# Patient Record
Sex: MALE | Race: WHITE | Employment: OTHER | URBAN - METROPOLITAN AREA
[De-identification: names, ages, dates, MRNs, and addresses within clinical notes are randomized per-mention and may not be internally consistent; named-entity substitution may affect disease eponyms.]

---

## 2018-05-18 ASSESSMENT — MIFFLIN-ST. JEOR: SCORE: 1637.57

## 2018-05-21 ENCOUNTER — ANESTHESIA - HEALTHEAST (OUTPATIENT)
Dept: SURGERY | Facility: HOSPITAL | Age: 73
End: 2018-05-21

## 2018-05-22 ENCOUNTER — SURGERY - HEALTHEAST (OUTPATIENT)
Dept: SURGERY | Facility: HOSPITAL | Age: 73
End: 2018-05-22

## 2018-06-07 ENCOUNTER — HOSPITAL ENCOUNTER (EMERGENCY)
Facility: CLINIC | Age: 73
Discharge: HOME | End: 2018-06-07
Attending: EMERGENCY MEDICINE | Admitting: EMERGENCY MEDICINE
Payer: MEDICARE

## 2018-06-07 VITALS
SYSTOLIC BLOOD PRESSURE: 120 MMHG | DIASTOLIC BLOOD PRESSURE: 70 MMHG | RESPIRATION RATE: 16 BRPM | HEIGHT: 70 IN | TEMPERATURE: 97.1 F | OXYGEN SATURATION: 96 %

## 2018-06-07 DIAGNOSIS — N99.820 POSTOPERATIVE HEMORRHAGE INVOLVING GENITOURINARY SYSTEM FOLLOWING GENITOURINARY PROCEDURE: ICD-10-CM

## 2018-06-07 DIAGNOSIS — R31.0 GROSS HEMATURIA: ICD-10-CM

## 2018-06-07 LAB
ALBUMIN UR-MCNC: 100 MG/DL
ALBUMIN UR-MCNC: ABNORMAL MG/DL
ANION GAP SERPL CALCULATED.3IONS-SCNC: 8 MMOL/L (ref 3–14)
APPEARANCE UR: ABNORMAL
APPEARANCE UR: ABNORMAL
BASOPHILS # BLD AUTO: 0.1 10E9/L (ref 0–0.2)
BASOPHILS NFR BLD AUTO: 0.5 %
BILIRUB UR QL STRIP: ABNORMAL
BILIRUB UR QL STRIP: NEGATIVE
BUN SERPL-MCNC: 20 MG/DL (ref 7–30)
CALCIUM SERPL-MCNC: 8.4 MG/DL (ref 8.5–10.1)
CHLORIDE SERPL-SCNC: 107 MMOL/L (ref 94–109)
CO2 SERPL-SCNC: 24 MMOL/L (ref 20–32)
COLOR UR AUTO: ABNORMAL
COLOR UR AUTO: ABNORMAL
CREAT SERPL-MCNC: 0.93 MG/DL (ref 0.66–1.25)
DIFFERENTIAL METHOD BLD: ABNORMAL
EOSINOPHIL # BLD AUTO: 0.2 10E9/L (ref 0–0.7)
EOSINOPHIL NFR BLD AUTO: 1.9 %
ERYTHROCYTE [DISTWIDTH] IN BLOOD BY AUTOMATED COUNT: 12.2 % (ref 10–15)
GFR SERPL CREATININE-BSD FRML MDRD: 79 ML/MIN/1.7M2
GLUCOSE SERPL-MCNC: 130 MG/DL (ref 70–99)
GLUCOSE UR STRIP-MCNC: 50 MG/DL
GLUCOSE UR STRIP-MCNC: ABNORMAL MG/DL
HCT VFR BLD AUTO: 40.3 % (ref 40–53)
HGB BLD-MCNC: 13.2 G/DL (ref 13.3–17.7)
HGB UR QL STRIP: ABNORMAL
HGB UR QL STRIP: ABNORMAL
IMM GRANULOCYTES # BLD: 0.1 10E9/L (ref 0–0.4)
IMM GRANULOCYTES NFR BLD: 0.5 %
KETONES UR STRIP-MCNC: 20 MG/DL
KETONES UR STRIP-MCNC: ABNORMAL MG/DL
LEUKOCYTE ESTERASE UR QL STRIP: ABNORMAL
LEUKOCYTE ESTERASE UR QL STRIP: NEGATIVE
LYMPHOCYTES # BLD AUTO: 0.9 10E9/L (ref 0.8–5.3)
LYMPHOCYTES NFR BLD AUTO: 9.6 %
MCH RBC QN AUTO: 30.7 PG (ref 26.5–33)
MCHC RBC AUTO-ENTMCNC: 32.8 G/DL (ref 31.5–36.5)
MCV RBC AUTO: 94 FL (ref 78–100)
MONOCYTES # BLD AUTO: 0.4 10E9/L (ref 0–1.3)
MONOCYTES NFR BLD AUTO: 4.3 %
NEUTROPHILS # BLD AUTO: 8.2 10E9/L (ref 1.6–8.3)
NEUTROPHILS NFR BLD AUTO: 83.2 %
NITRATE UR QL: ABNORMAL
NITRATE UR QL: NEGATIVE
NRBC # BLD AUTO: 0 10*3/UL
NRBC BLD AUTO-RTO: 0 /100
PH UR STRIP: 6 PH (ref 5–7)
PH UR STRIP: ABNORMAL PH (ref 5–7)
PLATELET # BLD AUTO: 213 10E9/L (ref 150–450)
POTASSIUM SERPL-SCNC: 4.5 MMOL/L (ref 3.4–5.3)
RBC # BLD AUTO: 4.3 10E12/L (ref 4.4–5.9)
RBC #/AREA URNS AUTO: 0 /HPF (ref 0–2)
RBC #/AREA URNS AUTO: ABNORMAL /HPF (ref 0–2)
SODIUM SERPL-SCNC: 139 MMOL/L (ref 133–144)
SOURCE: ABNORMAL
SOURCE: ABNORMAL
SP GR UR STRIP: 1.02 (ref 1–1.03)
SP GR UR STRIP: ABNORMAL (ref 1–1.03)
UROBILINOGEN UR STRIP-MCNC: 0 MG/DL (ref 0–2)
UROBILINOGEN UR STRIP-MCNC: ABNORMAL MG/DL (ref 0–2)
WBC # BLD AUTO: 9.8 10E9/L (ref 4–11)
WBC #/AREA URNS AUTO: ABNORMAL /HPF (ref 0–5)

## 2018-06-07 PROCEDURE — 81001 URINALYSIS AUTO W/SCOPE: CPT | Performed by: EMERGENCY MEDICINE

## 2018-06-07 PROCEDURE — 87086 URINE CULTURE/COLONY COUNT: CPT | Performed by: EMERGENCY MEDICINE

## 2018-06-07 PROCEDURE — 25000132 ZZH RX MED GY IP 250 OP 250 PS 637: Mod: GY | Performed by: EMERGENCY MEDICINE

## 2018-06-07 PROCEDURE — 99284 EMERGENCY DEPT VISIT MOD MDM: CPT | Mod: 25

## 2018-06-07 PROCEDURE — 80048 BASIC METABOLIC PNL TOTAL CA: CPT | Performed by: EMERGENCY MEDICINE

## 2018-06-07 PROCEDURE — 85025 COMPLETE CBC W/AUTO DIFF WBC: CPT | Performed by: EMERGENCY MEDICINE

## 2018-06-07 PROCEDURE — 99284 EMERGENCY DEPT VISIT MOD MDM: CPT | Mod: Z6 | Performed by: EMERGENCY MEDICINE

## 2018-06-07 PROCEDURE — 51702 INSERT TEMP BLADDER CATH: CPT

## 2018-06-07 PROCEDURE — A9270 NON-COVERED ITEM OR SERVICE: HCPCS | Mod: GY | Performed by: EMERGENCY MEDICINE

## 2018-06-07 PROCEDURE — 25000125 ZZHC RX 250: Performed by: EMERGENCY MEDICINE

## 2018-06-07 RX ORDER — CIPROFLOXACIN 500 MG/1
500 TABLET, FILM COATED ORAL 2 TIMES DAILY
Qty: 6 TABLET | Refills: 0 | Status: SHIPPED | OUTPATIENT
Start: 2018-06-07 | End: 2018-06-10

## 2018-06-07 RX ORDER — CIPROFLOXACIN 500 MG/1
500 TABLET, FILM COATED ORAL ONCE
Status: COMPLETED | OUTPATIENT
Start: 2018-06-07 | End: 2018-06-07

## 2018-06-07 RX ADMIN — LIDOCAINE HYDROCHLORIDE 10 ML: 20 JELLY TOPICAL at 05:44

## 2018-06-07 RX ADMIN — CIPROFLOXACIN 500 MG: 500 TABLET, FILM COATED ORAL at 06:12

## 2018-06-07 ASSESSMENT — ENCOUNTER SYMPTOMS
DIZZINESS: 0
LIGHT-HEADEDNESS: 0
HEMATURIA: 1
FREQUENCY: 1
COUGH: 0
CHEST TIGHTNESS: 0
SHORTNESS OF BREATH: 0
BRUISES/BLEEDS EASILY: 0
VOMITING: 0
DYSURIA: 0
ABDOMINAL PAIN: 0
APPETITE CHANGE: 0
BACK PAIN: 0
NAUSEA: 0
FEVER: 0

## 2018-06-07 NOTE — ED NOTES
Rosa cath placed w/o problem. Bag filling rapidly, dark red urine out. Pt reports feeling relief immediately after cath placement.

## 2018-06-07 NOTE — DISCHARGE INSTRUCTIONS
Blood in the Urine    Blood in the urine (hematuria) has many possible causes. If it occurs after an injury (such as a car accident or fall), it is most often a sign of bruising to the kidney or bladder. Common causes of blood in the urine include urinary tract infections, kidney stones, inflammation, tumors, or certain other diseases of the kidney or bladder. Menstruation can cause blood to appear in the urine sample, although it is not coming from the urinary tract.  If only a trace amount of blood is present, it will show up on the urine test, even though the urine may be yellow and not pink or red. This may occur with any of the above conditions, as well as heavy exercise or high fever. In this case, your doctor may want to repeat the urine test on another day. This will show if the blood is still present. If it is, then other tests can be done to find out the cause.  Home care  Follow these home care guidelines:    If your urine does not appear bloody (pink, brown or red) then you do not need to restrict your activity in any way.    If you can see blood in your urine, rest and avoid heavy exertion until your next exam. Do not use aspirin, blood thinners, or anti-platelet or anti-inflammatory medicines. These include ibuprofen and naproxen. These thin the blood and may increase bleeding.  Follow-up care  Follow up with your healthcare provider, or as advised. If you were injured and had blood in your urine, you should have a repeat urine test in 1 to 2 days. Contact your doctor for this test.  A radiologist will review any X-rays that were taken. You will be told of any new findings that may affect your care.  When to seek medical advice  Call your healthcare provider right away if any of these occur:    Bright red blood or blood clots in the urine (if you did not have this before)    Weakness, dizziness or fainting    New groin, abdominal, or back pain    Fever of 100.4 F (38 C) or higher, or as directed by  your healthcare provider    Repeated vomiting    Bleeding from the nose or gums or easy bruising  Date Last Reviewed: 9/1/2016 2000-2017 [employer]. [address] [place] [address], [place] [address]. All rights reserved. This information is not intended as a substitute for professional medical care. Always follow your healthcare professional's instructions.

## 2018-06-07 NOTE — ED NOTES
Pt reports he cystoscopy with transurethral prostate resection on May 22. Was discharge with catheter, which was removed two days later, and was told he would likely some blood in his urine. Reports he had been seeing a small amount of blood in his urine until tonight when it became much more concentrated blood. Pt reports he has urinary frequency and urgency. Reports very small amounts of urine out at a time. About 178cc in bladder per bladder scan. Pt denies pain.

## 2018-06-07 NOTE — ED AVS SNAPSHOT
[facility] Emergency Department    [address] [facility] [address] [address]    Phone:  [phone]    Fax:  [phone]                                       Wally Vázquez   MRN: [id number]    Department:  [facility] Emergency Department   Date of Visit:  6/7/2018           After Visit Summary Signature Page     I have received my discharge instructions, and my questions have been answered. I have discussed any challenges I see with this plan with the nurse or doctor.    ..........................................................................................................................................  Patient/Patient Representative Signature      ..........................................................................................................................................  Patient Representative Print Name and Relationship to Patient    ..................................................               ................................................  Date                                            Time    ..........................................................................................................................................  Reviewed by Signature/Title    ...................................................              ..............................................  Date                                                            Time

## 2018-06-07 NOTE — ED NOTES
Pt becker irrigated with 200cc more sterile water. No further clots seen, urine still red tinged. MD approved pt for discharge. Pt changed to leg beg and verbalizes understanding. Pt advised to get a hold of urology asap for to schedule appointment for today.

## 2018-06-07 NOTE — ED PROVIDER NOTES
History     Chief Complaint   Patient presents with     Hematuria     HPI  Wally Vázquez is a 72 year old male who is 16 days status post TURP procedure who presents for evaluation of gross hematuria.  Patient reports symptoms began this evening.  He denies any dysuria, but has urgency and frequency of a small amount of dark red adonay blood.  Denies fever chills.  Patient reports that after about a week of some small amount of bleeding post surgery, he had regular urination most of this week.  He does admit to additional physical activity over the last several days, lifting and moving more than he was advised to.  Denies any injury to the abdomen or pelvis.         Chart review:    Miscellaneous Notes  - in this encounter  Op Note - Milton Liang MD - 05/22/2018 5:59 PM CDT  Formatting of this note may be different from the original.  Operative Note    Name: Wally Vázquez  Location: [facility] Main OR  Procedure Date: 5/22/2018  PCP: Dillon Larios MD  Location: [facility] OR    CYSTOSCOPY, WITH TRANSURETHRAL RESECTION OF THE PROSTATE USING QUANTA LASER (N/A)    Pre-Procedure Diagnosis:  Benign prostate hyperplasia [N40.0]     Post-Procedure Diagnosis:   * No post-op diagnosis entered *    Surgeon(s):  MD Jose M Carrizales MD    Anesthesia Type:  General    Past Medical History:   Diagnosis Date     BPH (benign prostatic hyperplasia)     Hyperlipemia     Hypertension     There are no active problems to display for this patient.    Findings:  Trilobar hypertophy.     Operative Report: Patient underwent induction of general anesthesia and was prepped and draped in the dorsal lithotomy position under sterile conditions. The cystoscope was introduced and the prostatic urethra and bladder were well inspected. Patient had significant obstruction.    The lateral laser scope was introduced. The procedure was begun at 5 and 7:00 and the median lobe was taken down. . I then  progressively worked back from the bladder neck towards the verumontanum 7 to 10:00 and from 2 to 5:00. The tissue anteriorly was then addressed. There was reasonable hemostasis along the way.    At the end of the procedure the fossa was nice and open. The orifices were intact and the sphincter was intact.    Time of procedure was 47:25 Minutes, at 150 Blevins, and 420,850 Joules.    Estimated Blood Loss:   * No blood loss documented between In Room and Out of Room log events - 5/22/2018 4:14 PM to 5/22/2018 5:59 PM *    Specimens:   * No specimens in log *    Drains:   Urethral Catheter Triple-lumen;Latex 20 Fr. (Active)     Complications:   None    Milton Liang     Date: 5/22/2018 Time: 5:59 PM          Problem List:    Patient Active Problem List    Diagnosis Date Noted     Hypertension 08/07/2012     Priority: Medium     Hyperlipidemia LDL goal <100 08/07/2012     Priority: Medium        Past Medical History:    Past Medical History:   Diagnosis Date     BPH (benign prostatic hyperplasia)      Colon polyps      Diverticulosis      Mixed hyperlipidemia      Unspecified essential hypertension      Unspecified hyperplasia of prostate without urinary obstruction and other lower urinary tract symptoms (LUTS)        Past Surgical History:    Past Surgical History:   Procedure Laterality Date     COLONOSCOPY  4/22/2013    Procedure: COLONOSCOPY;  Colonoscopy;  Surgeon: Miko Alcazar MD;  Location: [facility]     HERNIA REPAIR       VITRECTOMY PARSPLANA WITH 25 GAUGE SYSTEM  5/2/2013    Procedure: VITRECTOMY PARSPLANA WITH 25 GAUGE SYSTEM;  LEFT 25 GAUGE PARSPLANA, VITRECTOMY,  EPIRETINAL MEMBRANE REMOVAL, AIR FLUID EXCHANGE, INFUSION 20% SF6 GAS ;  Surgeon: Prashanth Allison MD;  Location: [facility]       Family History:    Family History   Problem Relation Age of Onset     CANCER Father      colon     CANCER Brother      prostate       Social History:  Marital Status:   [2]  Social History   Substance Use  "Topics     Smoking status: Former Smoker     Packs/day: 1.00     Years: 30.00     Types: Cigarettes     Smokeless tobacco: Not on file     Alcohol use Yes      Comment: occass        Medications:      ciprofloxacin (CIPRO) 500 MG tablet   finasteride (PROSCAR) 5 MG tablet   lisinopril (PRINIVIL,ZESTRIL) 20 MG tablet   pravastatin (PRAVACHOL) 20 MG tablet   oxyCODONE-acetaminophen (PERCOCET) 5-325 MG per tablet         Review of Systems   Constitutional: Negative for appetite change and fever.   HENT: Negative for congestion.    Respiratory: Negative for cough, chest tightness and shortness of breath.    Cardiovascular: Negative for chest pain.   Gastrointestinal: Negative for abdominal pain, nausea and vomiting.   Genitourinary: Positive for frequency, hematuria and urgency. Negative for dysuria and scrotal swelling.   Musculoskeletal: Negative for back pain.   Skin: Negative for rash.   Neurological: Negative for dizziness and light-headedness.   Hematological: Does not bruise/bleed easily.   All other systems reviewed and are negative.      Physical Exam   BP: (!) 183/95  Heart Rate: 75  Temp: 97.1  F (36.2  C)  Resp: 16  Height: 177.8 cm (5' 10\")  SpO2: 97 %      Physical Exam   Constitutional: He is oriented to person, place, and time. He appears well-developed and well-nourished. No distress.   HENT:   Head: Normocephalic and atraumatic.   Mouth/Throat: Oropharynx is clear and moist.   Cardiovascular: Normal rate and regular rhythm.    Pulmonary/Chest: Effort normal.   Abdominal: Soft. There is tenderness (mild lower abdominal tenderness).   Neurological: He is alert and oriented to person, place, and time.   Skin: Skin is warm and dry.   Psychiatric: He has a normal mood and affect.   Nursing note and vitals reviewed.      ED Course     ED Course     Procedures             Results for orders placed or performed during the hospital encounter of 06/07/18 (from the past 24 hour(s))   UA reflex to Microscopic "   Result Value Ref Range    Color Urine Canceled, Test credited     Appearance Urine Canceled, Test credited     Glucose Urine Canceled, Test credited (A) NEG^Negative mg/dL    Bilirubin Urine Canceled, Test credited (A) NEG^Negative    Ketones Urine Canceled, Test credited (A) NEG^Negative mg/dL    Specific Gravity Urine Canceled, Test credited 1.003 - 1.035    Blood Urine Canceled, Test credited (A) NEG^Negative    pH Urine Canceled, Test credited 5.0 - 7.0 pH    Protein Albumin Urine Canceled, Test credited (A) NEG^Negative mg/dL    Urobilinogen mg/dL Canceled, Test credited 0.0 - 2.0 mg/dL    Nitrite Urine Canceled, Test credited (A) NEG^Negative    Leukocyte Esterase Urine Canceled, Test credited (A) NEG^Negative    Source Canceled, Test credited     RBC Urine 0 0 - 2 /HPF    WBC Urine None 0 - 5 /HPF   UA reflex to Microscopic   Result Value Ref Range    Color Urine Red     Appearance Urine Cloudy     Glucose Urine 50 (A) NEG^Negative mg/dL    Bilirubin Urine Negative NEG^Negative    Ketones Urine 20 (A) NEG^Negative mg/dL    Specific Gravity Urine 1.024 1.003 - 1.035    Blood Urine Moderate (A) NEG^Negative    pH Urine 6.0 5.0 - 7.0 pH    Protein Albumin Urine 100 (A) NEG^Negative mg/dL    Urobilinogen mg/dL 0.0 0.0 - 2.0 mg/dL    Nitrite Urine Negative NEG^Negative    Leukocyte Esterase Urine Negative NEG^Negative    Source Midstream Urine     RBC Urine  0 - 2 /HPF     Unable to quantitate other elements due to packed RBC's.   Basic metabolic panel   Result Value Ref Range    Sodium 139 133 - 144 mmol/L    Potassium 4.5 3.4 - 5.3 mmol/L    Chloride 107 94 - 109 mmol/L    Carbon Dioxide 24 20 - 32 mmol/L    Anion Gap 8 3 - 14 mmol/L    Glucose 130 (H) 70 - 99 mg/dL    Urea Nitrogen 20 7 - 30 mg/dL    Creatinine 0.93 0.66 - 1.25 mg/dL    GFR Estimate 79 >60 mL/min/1.7m2    GFR Estimate If Black >90 >60 mL/min/1.7m2    Calcium 8.4 (L) 8.5 - 10.1 mg/dL   CBC with platelets, differential   Result Value Ref Range     WBC 9.8 4.0 - 11.0 10e9/L    RBC Count 4.30 (L) 4.4 - 5.9 10e12/L    Hemoglobin 13.2 (L) 13.3 - 17.7 g/dL    Hematocrit 40.3 40.0 - 53.0 %    MCV 94 78 - 100 fl    MCH 30.7 26.5 - 33.0 pg    MCHC 32.8 31.5 - 36.5 g/dL    RDW 12.2 10.0 - 15.0 %    Platelet Count 213 150 - 450 10e9/L    Diff Method Automated Method     % Neutrophils 83.2 %    % Lymphocytes 9.6 %    % Monocytes 4.3 %    % Eosinophils 1.9 %    % Basophils 0.5 %    % Immature Granulocytes 0.5 %    Nucleated RBCs 0 0 /100    Absolute Neutrophil 8.2 1.6 - 8.3 10e9/L    Absolute Lymphocytes 0.9 0.8 - 5.3 10e9/L    Absolute Monocytes 0.4 0.0 - 1.3 10e9/L    Absolute Eosinophils 0.2 0.0 - 0.7 10e9/L    Absolute Basophils 0.1 0.0 - 0.2 10e9/L    Abs Immature Granulocytes 0.1 0 - 0.4 10e9/L    Absolute Nucleated RBC 0.0        Medications   ciprofloxacin (CIPRO) tablet 500 mg (not administered)   lidocaine 2 % (URO-JET) jelly 10 mL (10 mLs Urethral Given 6/7/18 0544)        4:51 AM: Lab notes UA is adonay blood even after spinning down. Paged his urologist to discuss.    5:24 AM: Discussed with Dr Thibodeaux of [facility]. Recommends becker 20f if possible. Irrigate to remove clots and ideally until running clear. Go to office today - call at 8pm.  Emperic cipro.    Assessments & Plan (with Medical Decision Making)  72-year-old male who is 60 days postop from a TURP procedure pending for evaluation of gross hematuria.  Patient has urinary urgency and frequency but no dysuria.  No fever or chills.  Does report increased physical activity preceding this bleeding.  Is not on any blood thinners.  Urinalysis showed gross hematuria without clear evidence of infection.  Hemoglobin at 13.2.  Hemodynamically normal.  Discussed with urology who recommended Becker catheter placement and irrigation.  If bleeding stabilizes, can follow-up in the office today.     I have reviewed the nursing notes.    I have reviewed the findings, diagnosis, plan and need for follow up with  the patient.       New Prescriptions    CIPROFLOXACIN (CIPRO) 500 MG TABLET    Take 1 tablet (500 mg) by mouth 2 times daily for 3 days       Final diagnoses:   Gross hematuria   Postoperative hemorrhage involving genitourinary system following genitourinary procedure       6/7/2018   [facility] EMERGENCY DEPARTMENT     Ochoa, Milton Us MD  06/07/18 0601

## 2018-06-07 NOTE — ED PROVIDER NOTES
Emergency Department Patient Sign-out       Brief HPI:  This is a 72 year old male signed out to me by Dr.C Ochoa at shift change at 6.15AM .  See initial ED Provider note for details of the presentation.  At sign out plan is to discharge to home if Rosa irrigation continues to reveal no large clots and no concern for urinary retention with bladder outlet obstruction.  Briefly by report patient presented 16 days post cystoscopy with transurethral resection of the prostate performed at [facility] (by Dr MARIA DE JESUS phillips).  Patient reports increase in activity over the last 48 hours with yard work with moving rocks at home resulting in gross hematuria.  No abdominal pain, no back pain, no fever.  Rosa was placed, irrigation completed plan was for the patient to follow-up with his urology group early in the morning. Dr MARIA DE JESUS Ochoa spoke with Dr Thibodeaux- on-call Urologist for the group that performed surgery on patient with plan to have patient see the group or primary provider in the office.       Significant Events prior to my assuming care: Rosa was placed, urology consultation by Dr. Ochoa Rosa irrigation and bladder irrigation.  Patient was given oral ciprofloxacin with, normal hemoglobin normal renal function.  Urine culture pending.  Plan to discharged home with oral ciprofloxacin pending additional care follow-up with urology     Significant events after I assumed care: I reviewed the operative notes and record dated 5/22/18 by Dr MARIA DE JESUS Phillips. Patient was seen and examined at 7AM. Handoff and plan of care was reviewed with this patient and his spouse who is at the bedside.  Patient lives in [place], [place].  Here in the ED with his wife Mariel.  Reports taking lisinopril and pravastatin and no allergies.  Plan of care with follow-up in urology clinic office today discussed with the patient.  Actual appointment is not scheduled yet but patient tells me he is comfortable going home and  "plans to make an appointment to ensure that he seen today.  Rosa had been irrigated with about 1L of fluids by primary nurse (Yimi Snyder RN).  Light pink urine without large clots.  Patient in no pain no fever and otherwise hemodynamically stable.  Eager to go home.  Discussed reasons to return to the ED for care.  At the time of discharge from the ED urine from the Rosa post irrigation was in the color and consistency of pink Delgado-Aid.      Exam:   Patient Vitals for the past 24 hrs:   BP Temp Temp src Heart Rate Resp SpO2 Height   06/07/18 0430 - - - - - 96 % -   06/07/18 0415 - - - - - 97 % -   06/07/18 0409 (!) 167/94 97.1  F (36.2  C) Oral 75 16 97 % 1.778 m (5' 10\")   06/07/18 0407 (!) 183/95 - - - - - -           ED RESULTS:   Results for orders placed or performed during the hospital encounter of 06/07/18 (from the past 24 hour(s))   UA reflex to Microscopic     Status: Abnormal    Collection Time: 06/07/18  4:05 AM   Result Value Ref Range    Color Urine Canceled, Test credited     Appearance Urine Canceled, Test credited     Glucose Urine Canceled, Test credited (A) NEG^Negative mg/dL    Bilirubin Urine Canceled, Test credited (A) NEG^Negative    Ketones Urine Canceled, Test credited (A) NEG^Negative mg/dL    Specific Gravity Urine Canceled, Test credited 1.003 - 1.035    Blood Urine Canceled, Test credited (A) NEG^Negative    pH Urine Canceled, Test credited 5.0 - 7.0 pH    Protein Albumin Urine Canceled, Test credited (A) NEG^Negative mg/dL    Urobilinogen mg/dL Canceled, Test credited 0.0 - 2.0 mg/dL    Nitrite Urine Canceled, Test credited (A) NEG^Negative    Leukocyte Esterase Urine Canceled, Test credited (A) NEG^Negative    Source Canceled, Test credited     RBC Urine 0 0 - 2 /HPF    WBC Urine None 0 - 5 /HPF   UA reflex to Microscopic     Status: Abnormal    Collection Time: 06/07/18  4:05 AM   Result Value Ref Range    Color Urine Red     Appearance Urine Cloudy     Glucose Urine 50 (A) " NEG^Negative mg/dL    Bilirubin Urine Negative NEG^Negative    Ketones Urine 20 (A) NEG^Negative mg/dL    Specific Gravity Urine 1.024 1.003 - 1.035    Blood Urine Moderate (A) NEG^Negative    pH Urine 6.0 5.0 - 7.0 pH    Protein Albumin Urine 100 (A) NEG^Negative mg/dL    Urobilinogen mg/dL 0.0 0.0 - 2.0 mg/dL    Nitrite Urine Negative NEG^Negative    Leukocyte Esterase Urine Negative NEG^Negative    Source Midstream Urine     RBC Urine  0 - 2 /HPF     Unable to quantitate other elements due to packed RBC's.   Basic metabolic panel     Status: Abnormal    Collection Time: 06/07/18  4:50 AM   Result Value Ref Range    Sodium 139 133 - 144 mmol/L    Potassium 4.5 3.4 - 5.3 mmol/L    Chloride 107 94 - 109 mmol/L    Carbon Dioxide 24 20 - 32 mmol/L    Anion Gap 8 3 - 14 mmol/L    Glucose 130 (H) 70 - 99 mg/dL    Urea Nitrogen 20 7 - 30 mg/dL    Creatinine 0.93 0.66 - 1.25 mg/dL    GFR Estimate 79 >60 mL/min/1.7m2    GFR Estimate If Black >90 >60 mL/min/1.7m2    Calcium 8.4 (L) 8.5 - 10.1 mg/dL   CBC with platelets, differential     Status: Abnormal    Collection Time: 06/07/18  4:50 AM   Result Value Ref Range    WBC 9.8 4.0 - 11.0 10e9/L    RBC Count 4.30 (L) 4.4 - 5.9 10e12/L    Hemoglobin 13.2 (L) 13.3 - 17.7 g/dL    Hematocrit 40.3 40.0 - 53.0 %    MCV 94 78 - 100 fl    MCH 30.7 26.5 - 33.0 pg    MCHC 32.8 31.5 - 36.5 g/dL    RDW 12.2 10.0 - 15.0 %    Platelet Count 213 150 - 450 10e9/L    Diff Method Automated Method     % Neutrophils 83.2 %    % Lymphocytes 9.6 %    % Monocytes 4.3 %    % Eosinophils 1.9 %    % Basophils 0.5 %    % Immature Granulocytes 0.5 %    Nucleated RBCs 0 0 /100    Absolute Neutrophil 8.2 1.6 - 8.3 10e9/L    Absolute Lymphocytes 0.9 0.8 - 5.3 10e9/L    Absolute Monocytes 0.4 0.0 - 1.3 10e9/L    Absolute Eosinophils 0.2 0.0 - 0.7 10e9/L    Absolute Basophils 0.1 0.0 - 0.2 10e9/L    Abs Immature Granulocytes 0.1 0 - 0.4 10e9/L    Absolute Nucleated RBC 0.0        ED MEDICATIONS:    Medications   lidocaine 2 % (URO-JET) jelly 10 mL (10 mLs Urethral Given 6/7/18 6873)   ciprofloxacin (CIPRO) tablet 500 mg (500 mg Oral Given 6/7/18 0612)         Impression:    ICD-10-CM    1. Gross hematuria R31.0 UA reflex to Microscopic     UA reflex to Microscopic     Urine Culture Aerobic Bacterial     Urine Culture Aerobic Bacterial   2. Postoperative hemorrhage involving genitourinary system following genitourinary procedure N99.820        Plan:    Discharge to home with Becker in place.  Becker care reviewed with patient and spouse by nursing. Follow-up with your primary Urology team in the morning (go to office for additional cares). Ciprofloxacin for home while becker catheter is in place.      Adam Cifuentes MD  06/07/18 7406

## 2018-06-07 NOTE — ED NOTES
Cath irrigated with 750 cc with sterile water. Remains red, but is more clear. Some large clots seen. Will allow pt to completely drain and will continue with irrigation.

## 2018-06-07 NOTE — ED AVS SNAPSHOT
[facility] Emergency Department    [address] [facility] [address]    Phone:  [phone]    Fax:  [phone]                                       Wally Vázquez   MRN: [id number]    Department:  [facility] Emergency Department   Date of Visit:  6/7/2018           Patient Information     Date Of Birth          1945        Your diagnoses for this visit were:     Gross hematuria     Postoperative hemorrhage involving genitourinary system following genitourinary procedure        You were seen by Milton Ochoa MD and Adam Ruiz MD.      Follow-up Information     Follow up with Milton Liang MD. Call today.    Specialty:  Urology    Why:  To schedule a follow up visit today    Contact information:    [facility]  [facility] [id number] [phone]          Discharge Instructions         Blood in the Urine    Blood in the urine (hematuria) has many possible causes. If it occurs after an injury (such as a car accident or fall), it is most often a sign of bruising to the kidney or bladder. Common causes of blood in the urine include urinary tract infections, kidney stones, inflammation, tumors, or certain other diseases of the kidney or bladder. Menstruation can cause blood to appear in the urine sample, although it is not coming from the urinary tract.  If only a trace amount of blood is present, it will show up on the urine test, even though the urine may be yellow and not pink or red. This may occur with any of the above conditions, as well as heavy exercise or high fever. In this case, your doctor may want to repeat the urine test on another day. This will show if the blood is still present. If it is, then other tests can be done to find out the cause.  Home care  Follow these home care guidelines:    If your urine does not appear bloody (pink, brown or red) then you do not need to restrict your activity in any  way.    If you can see blood in your urine, rest and avoid heavy exertion until your next exam. Do not use aspirin, blood thinners, or anti-platelet or anti-inflammatory medicines. These include ibuprofen and naproxen. These thin the blood and may increase bleeding.  Follow-up care  Follow up with your healthcare provider, or as advised. If you were injured and had blood in your urine, you should have a repeat urine test in 1 to 2 days. Contact your doctor for this test.  A radiologist will review any X-rays that were taken. You will be told of any new findings that may affect your care.  When to seek medical advice  Call your healthcare provider right away if any of these occur:    Bright red blood or blood clots in the urine (if you did not have this before)    Weakness, dizziness or fainting    New groin, abdominal, or back pain    Fever of 100.4 F (38 C) or higher, or as directed by your healthcare provider    Repeated vomiting    Bleeding from the nose or gums or easy bruising  Date Last Reviewed: 9/1/2016 2000-2017 [employer]. [address] [place] [address]. All rights reserved. This information is not intended as a substitute for professional medical care. Always follow your healthcare professional's instructions.          Discharge References/Attachments     [facility] CATHETER, CARE (ENGLISH)      24 Hour Appointment Hotline       To make an appointment at any [facility] clinic, call [phone] ([phone]). If you don't have a family doctor or clinic, we will help you find one. [facility] clinics are conveniently located to serve the needs of you and your family.             Review of your medicines      START taking        Dose / Directions Last dose taken    ciprofloxacin 500 MG tablet   Commonly known as:  CIPRO   Dose:  500 mg   Quantity:  6 tablet        Take 1 tablet (500 mg) by mouth 2 times daily for 3 days   Refills:  0          Our records show that you are taking the  medicines listed below. If these are incorrect, please call your family doctor or clinic.        Dose / Directions Last dose taken    finasteride 5 MG tablet   Commonly known as:  PROSCAR        Daily   Refills:  0        lisinopril 20 MG tablet   Commonly known as:  PRINIVIL/ZESTRIL   Dose:  20 mg   Quantity:  90 tablet        Take 1 tablet by mouth daily.   Refills:  4        oxyCODONE-acetaminophen 5-325 MG per tablet   Commonly known as:  PERCOCET   Dose:  1 tablet   Quantity:  20 tablet        Take 1 tablet by mouth every 6 hours as needed for moderate to severe pain   Refills:  0        pravastatin 20 MG tablet   Commonly known as:  PRAVACHOL        Daily   Refills:  0                Prescriptions were sent or printed at these locations (1 Prescription)                   [facility] [address] [place] [address] [facility]   [address] [facility] [address]    Telephone:  [phone]   Fax:  [phone]   Hours:                  E-Prescribed (1 of 1)         ciprofloxacin (CIPRO) 500 MG tablet                Procedures and tests performed during your visit     Procedure/Test Number of Times Performed    Basic metabolic panel 1    CBC with platelets, differential 1    Continue indwelling urinary catheter ([place]osa) 1    UA reflex to Microscopic 2    Urine Culture Aerobic Bacterial 1      Orders Needing Specimen Collection     None      Pending Results     Date and Time Order Name Status Description    6/7/2018 0405 Urine Culture Aerobic Bacterial In process             Pending Culture Results     Date and Time Order Name Status Description    6/7/2018 0405 Urine Culture Aerobic Bacterial In process             Pending Results Instructions     If you had any lab results that were not finalized at the time of your Discharge, you can call the ED Lab Result RN at [phone]. You will be contacted by this team for any positive Lab results or changes in treatment. The nurses are available 7 days a week from  10A to 6:30P.  You can leave a message 24 hours per day and they will return your call.        Test Results From Your Hospital Stay        6/7/2018  5:46 AM      Component Results     Component Value Ref Range & Units Status    Color Urine Canceled, Test credited  Corrected    CORRECTED ON 06/07 AT 0545: PREVIOUSLY REPORTED AS Red    Appearance Urine Canceled, Test credited  Corrected    CORRECTED ON 06/07 AT 0545: PREVIOUSLY REPORTED AS Cloudy    Glucose Urine Canceled, Test credited (A) NEG^Negative mg/dL Corrected    CORRECTED ON 06/07 AT 0545: PREVIOUSLY REPORTED AS 50    Bilirubin Urine Canceled, Test credited (A) NEG^Negative Corrected    CORRECTED ON 06/07 AT 0545: PREVIOUSLY REPORTED AS Negative    Ketones Urine Canceled, Test credited (A) NEG^Negative mg/dL Corrected    CORRECTED ON 06/07 AT 0545: PREVIOUSLY REPORTED AS 20    Specific Gravity Urine Canceled, Test credited 1.003 - 1.035 Corrected    CORRECTED ON 06/07 AT 0545: PREVIOUSLY REPORTED AS 1.024    Blood Urine Canceled, Test credited (A) NEG^Negative Corrected    CORRECTED ON 06/07 AT 0545: PREVIOUSLY REPORTED AS Moderate    pH Urine Canceled, Test credited 5.0 - 7.0 pH Corrected    CORRECTED ON 06/07 AT 0545: PREVIOUSLY REPORTED AS 6.0    Protein Albumin Urine Canceled, Test credited (A) NEG^Negative mg/dL Corrected    CORRECTED ON 06/07 AT 0545: PREVIOUSLY REPORTED     Urobilinogen mg/dL Canceled, Test credited 0.0 - 2.0 mg/dL Corrected    CORRECTED ON 06/07 AT 0545: PREVIOUSLY REPORTED AS 0.0    Nitrite Urine Canceled, Test credited (A) NEG^Negative Corrected    CORRECTED ON 06/07 AT 0545: PREVIOUSLY REPORTED AS Negative    Leukocyte Esterase Urine Canceled, Test credited (A) NEG^Negative Corrected    CORRECTED ON 06/07 AT 0545: PREVIOUSLY REPORTED AS Negative    Source Canceled, Test credited  Corrected    CORRECTED ON 06/07 AT 0545: PREVIOUSLY REPORTED AS Midstream Urine    RBC Urine 0 0 - 2 /HPF Final    WBC Urine None 0 - 5 /HPF Final          6/7/2018  5:16 AM      Component Results     Component Value Ref Range & Units Status    Sodium 139 133 - 144 mmol/L Final    Potassium 4.5 3.4 - 5.3 mmol/L Final    Chloride 107 94 - 109 mmol/L Final    Carbon Dioxide 24 20 - 32 mmol/L Final    Anion Gap 8 3 - 14 mmol/L Final    Glucose 130 (H) 70 - 99 mg/dL Final    Urea Nitrogen 20 7 - 30 mg/dL Final    Creatinine 0.93 0.66 - 1.25 mg/dL Final    GFR Estimate 79 >60 mL/min/1.7m2 Final    Non  GFR Calc    GFR Estimate If Black >90 >60 mL/min/1.7m2 Final    African American GFR Calc    Calcium 8.4 (L) 8.5 - 10.1 mg/dL Final         6/7/2018  5:17 AM      Component Results     Component Value Ref Range & Units Status    WBC 9.8 4.0 - 11.0 10e9/L Final    RBC Count 4.30 (L) 4.4 - 5.9 10e12/L Final    Hemoglobin 13.2 (L) 13.3 - 17.7 g/dL Final    Hematocrit 40.3 40.0 - 53.0 % Final    MCV 94 78 - 100 fl Final    MCH 30.7 26.5 - 33.0 pg Final    MCHC 32.8 31.5 - 36.5 g/dL Final    RDW 12.2 10.0 - 15.0 % Final    Platelet Count 213 150 - 450 10e9/L Final    Diff Method Automated Method  Final    % Neutrophils 83.2 % Final    % Lymphocytes 9.6 % Final    % Monocytes 4.3 % Final    % Eosinophils 1.9 % Final    % Basophils 0.5 % Final    % Immature Granulocytes 0.5 % Final    Nucleated RBCs 0 0 /100 Final    Absolute Neutrophil 8.2 1.6 - 8.3 10e9/L Final    Absolute Lymphocytes 0.9 0.8 - 5.3 10e9/L Final    Absolute Monocytes 0.4 0.0 - 1.3 10e9/L Final    Absolute Eosinophils 0.2 0.0 - 0.7 10e9/L Final    Absolute Basophils 0.1 0.0 - 0.2 10e9/L Final    Abs Immature Granulocytes 0.1 0 - 0.4 10e9/L Final    Absolute Nucleated RBC 0.0  Final         6/7/2018  5:42 AM      Component Results     Component Value Ref Range & Units Status    Color Urine Red  Final    Appearance Urine Cloudy  Final    Glucose Urine 50 (A) NEG^Negative mg/dL Final    Bilirubin Urine Negative NEG^Negative Final    Ketones Urine 20 (A) NEG^Negative mg/dL Final    Specific  "[place] Urine 1.024 1.003 - 1.035 Final    Blood Urine Moderate (A) NEG^Negative Final    pH Urine 6.0 5.0 - 7.0 pH Final    Protein Albumin Urine 100 (A) NEG^Negative mg/dL Final    Urobilinogen mg/dL 0.0 0.0 - 2.0 mg/dL Final    Nitrite Urine Negative NEG^Negative Final    Leukocyte Esterase Urine Negative NEG^Negative Final    Source Midstream Urine  Final    RBC Urine  0 - 2 /HPF Final    Unable to quantitate other elements due to packed RBC's.         2018  5:49 AM                Thank you for choosing [facility]       Thank you for choosing [facility] for your care. Our goal is always to provide you with excellent care. Hearing back from our patients is one way we can continue to improve our services. Please take a few minutes to complete the written survey that you may receive in the mail after you visit with us. Thank you!        [employer] Information     [facility] lets you send messages to your doctor, view your test results, renew your prescriptions, schedule appointments and more. To sign up, go to [email or web address] . Click on \"Log in\" on the left side of the screen, which will take you to the Welcome page. Then click on \"Sign up Now\" on the right side of the page.     You will be asked to enter the access code listed below, as well as some personal information. Please follow the directions to create your username and password.     Your access code is: [email or web address]  Expires: 2018  6:08 AM     Your access code will  in 90 days. If you need help or a new code, please call your [facility] clinic or [phone].        Care EveryWhere ID     This is your Care EveryWhere ID. This could be used by other organizations to access your [facility] medical records  [id number]        Equal Access to Services     MARISEL ALBERTO : Nadeen Neri, kristy sinha, anahi dela cruz, oliver rivera. [facility] [phone].    ATENCIÓN: Si jmla español, tiene a craft " disposición servicios gratuitos de asistencia lingüística. Sima al [phone].    We comply with applicable federal civil rights laws and [place] laws. We do not discriminate on the basis of race, color, national origin, age, disability, sex, sexual orientation, or gender identity.            After Visit Summary       This is your record. Keep this with you and show to your community pharmacist(s) and doctor(s) at your next visit.

## 2018-06-08 LAB
BACTERIA SPEC CULT: NO GROWTH
SPECIMEN SOURCE: NORMAL

## 2019-01-02 ENCOUNTER — ANESTHESIA EVENT (OUTPATIENT)
Dept: GASTROENTEROLOGY | Facility: CLINIC | Age: 74
End: 2019-01-02
Payer: MEDICARE

## 2019-01-02 NOTE — ANESTHESIA PREPROCEDURE EVALUATION
Anesthesia Pre-Procedure Evaluation    Patient: Wally Vázquez   MRN: [id number] : 1945          Preoperative Diagnosis: screening    Procedure(s):  COLONOSCOPY    Past Medical History:   Diagnosis Date     BPH (benign prostatic hyperplasia)      Colon polyps     last colonsocopy .     Diverticulosis      Mixed hyperlipidemia      Unspecified essential hypertension      Unspecified hyperplasia of prostate without urinary obstruction and other lower urinary tract symptoms (LUTS)      Past Surgical History:   Procedure Laterality Date     COLONOSCOPY  2013    Procedure: COLONOSCOPY;  Colonoscopy;  Surgeon: Miko Alcazar MD;  Location: [facility]     HERNIA REPAIR       VITRECTOMY PARSPLANA WITH 25 GAUGE SYSTEM  2013    Procedure: VITRECTOMY PARSPLANA WITH 25 GAUGE SYSTEM;  LEFT 25 GAUGE PARSPLANA, VITRECTOMY,  EPIRETINAL MEMBRANE REMOVAL, AIR FLUID EXCHANGE, INFUSION 20% SF6 GAS ;  Surgeon: Prashanth Allison MD;  Location: [facility]       Anesthesia Evaluation     . Pt has had prior anesthetic. Type: MAC           ROS/MED HX    ENT/Pulmonary:  - neg pulmonary ROS     Neurologic:  - neg neurologic ROS     Cardiovascular:     (+) Dyslipidemia, hypertension----. : . . . :. .       METS/Exercise Tolerance:  >4 METS   Hematologic:  - neg hematologic  ROS       Musculoskeletal:  - neg musculoskeletal ROS       GI/Hepatic:     (+) Other GI/Hepatic diverticulosis      Renal/Genitourinary:  - ROS Renal section negative       Endo:  - neg endo ROS       Psychiatric:  - neg psychiatric ROS       Infectious Disease:  - neg infectious disease ROS       Malignancy:      - no malignancy   Other:    - neg other ROS                      Physical Exam  Normal systems: pulmonary    Airway   Mallampati: II  TM distance: >3 FB  Neck ROM: full    Dental   (+) caps    Cardiovascular       Pulmonary             Lab Results   Component Value Date    WBC 9.8 2018    HGB 13.2 (L) 2018    HCT 40.3 2018     " 06/07/2018     06/07/2018    POTASSIUM 4.5 06/07/2018    CHLORIDE 107 06/07/2018    CO2 24 06/07/2018    BUN 20 06/07/2018    CR 0.93 06/07/2018     (H) 06/07/2018    RAMIN 8.4 (L) 06/07/2018    ALBUMIN 4.4 10/26/2011    PROTTOTAL 7.5 10/26/2011    ALT 19 08/07/2012    AST 24.0 10/26/2011    ALKPHOS 116.0 10/26/2011    BILITOTAL 0.5 10/26/2011    TSH 2.77 10/26/2011       Preop Vitals  BP Readings from Last 3 Encounters:   06/07/18 120/70   07/23/14 145/77   04/17/14 146/66    Pulse Readings from Last 3 Encounters:   07/23/14 71   04/17/14 72   08/07/12 67      Resp Readings from Last 3 Encounters:   06/07/18 16   05/02/13 16   04/22/13 16    SpO2 Readings from Last 3 Encounters:   06/07/18 96%   07/23/14 98%   05/02/13 98%      Temp Readings from Last 1 Encounters:   06/07/18 36.2  C (97.1  F) (Oral)    Ht Readings from Last 1 Encounters:   06/07/18 1.778 m (5' 10\")      Wt Readings from Last 1 Encounters:   04/17/14 87.5 kg (193 lb)    Estimated body mass index is 27.69 kg/m  as calculated from the following:    Height as of 6/7/18: 1.778 m (5' 10\").    Weight as of 4/17/14: 87.5 kg (193 lb).       Anesthesia Plan      History & Physical Review  History and physical reviewed and following examination; no interval change.    ASA Status:  2 .    NPO Status:  > 8 hours    Plan for General and MAC with Propofol and Intravenous induction. Reason for MAC:  Deep or markedly invasive procedure (G8)  PONV prophylaxis:  Ondansetron (or other 5HT-3) and Dexamethasone or Solumedrol       Postoperative Care  Postoperative pain management:  IV analgesics and Oral pain medications.      Consents  Anesthetic plan, risks, benefits and alternatives discussed with:  Patient..                 FERNANDO Stoddard CRNA  "

## 2019-01-03 ENCOUNTER — ANESTHESIA (OUTPATIENT)
Dept: GASTROENTEROLOGY | Facility: CLINIC | Age: 74
End: 2019-01-03
Payer: MEDICARE

## 2019-01-03 ENCOUNTER — HOSPITAL ENCOUNTER (OUTPATIENT)
Facility: CLINIC | Age: 74
Discharge: HOME | End: 2019-01-03
Attending: SURGERY | Admitting: SURGERY
Payer: MEDICARE

## 2019-01-03 VITALS
HEIGHT: 71 IN | BODY MASS INDEX: 27.02 KG/M2 | DIASTOLIC BLOOD PRESSURE: 70 MMHG | SYSTOLIC BLOOD PRESSURE: 127 MMHG | OXYGEN SATURATION: 98 % | RESPIRATION RATE: 16 BRPM | WEIGHT: 193 LBS

## 2019-01-03 LAB — COLONOSCOPY: NORMAL

## 2019-01-03 PROCEDURE — 25000125 ZZHC RX 250: Performed by: NURSE ANESTHETIST, CERTIFIED REGISTERED

## 2019-01-03 PROCEDURE — 45384 COLONOSCOPY W/LESION REMOVAL: CPT | Mod: PT,XU

## 2019-01-03 PROCEDURE — 45381 COLONOSCOPY SUBMUCOUS NJX: CPT | Performed by: SURGERY

## 2019-01-03 PROCEDURE — 88305 TISSUE EXAM BY PATHOLOGIST: CPT | Mod: 26 | Performed by: SURGERY

## 2019-01-03 PROCEDURE — 45380 COLONOSCOPY AND BIOPSY: CPT | Mod: PT,XU

## 2019-01-03 PROCEDURE — 25000128 H RX IP 250 OP 636: Performed by: NURSE ANESTHETIST, CERTIFIED REGISTERED

## 2019-01-03 PROCEDURE — 45385 COLONOSCOPY W/LESION REMOVAL: CPT | Mod: PT | Performed by: SURGERY

## 2019-01-03 PROCEDURE — 45380 COLONOSCOPY AND BIOPSY: CPT | Mod: 59 | Performed by: SURGERY

## 2019-01-03 PROCEDURE — 88305 TISSUE EXAM BY PATHOLOGIST: CPT | Performed by: SURGERY

## 2019-01-03 PROCEDURE — 45384 COLONOSCOPY W/LESION REMOVAL: CPT | Mod: 59 | Performed by: SURGERY

## 2019-01-03 PROCEDURE — 25000128 H RX IP 250 OP 636: Performed by: SURGERY

## 2019-01-03 PROCEDURE — 45381 COLONOSCOPY SUBMUCOUS NJX: CPT | Mod: PT | Performed by: SURGERY

## 2019-01-03 PROCEDURE — 37000008 ZZH ANESTHESIA TECHNICAL FEE, 1ST 30 MIN: Performed by: SURGERY

## 2019-01-03 RX ORDER — LIDOCAINE HYDROCHLORIDE 10 MG/ML
INJECTION, SOLUTION INFILTRATION; PERINEURAL PRN
Status: DISCONTINUED | OUTPATIENT
Start: 2019-01-03 | End: 2019-01-03

## 2019-01-03 RX ORDER — SODIUM CHLORIDE, SODIUM LACTATE, POTASSIUM CHLORIDE, CALCIUM CHLORIDE 600; 310; 30; 20 MG/100ML; MG/100ML; MG/100ML; MG/100ML
INJECTION, SOLUTION INTRAVENOUS CONTINUOUS
Status: DISCONTINUED | OUTPATIENT
Start: 2019-01-03 | End: 2019-01-03 | Stop reason: HOSPADM

## 2019-01-03 RX ORDER — LIDOCAINE 40 MG/G
CREAM TOPICAL
Status: DISCONTINUED | OUTPATIENT
Start: 2019-01-03 | End: 2019-01-03 | Stop reason: HOSPADM

## 2019-01-03 RX ORDER — PROPOFOL 10 MG/ML
INJECTION, EMULSION INTRAVENOUS PRN
Status: DISCONTINUED | OUTPATIENT
Start: 2019-01-03 | End: 2019-01-03

## 2019-01-03 RX ORDER — ONDANSETRON 2 MG/ML
4 INJECTION INTRAMUSCULAR; INTRAVENOUS
Status: DISCONTINUED | OUTPATIENT
Start: 2019-01-03 | End: 2019-01-03 | Stop reason: HOSPADM

## 2019-01-03 RX ADMIN — PROPOFOL 30 MG: 10 INJECTION, EMULSION INTRAVENOUS at 11:38

## 2019-01-03 RX ADMIN — LIDOCAINE HYDROCHLORIDE 1 ML: 10 INJECTION, SOLUTION EPIDURAL; INFILTRATION; INTRACAUDAL; PERINEURAL at 11:23

## 2019-01-03 RX ADMIN — PROPOFOL 30 MG: 10 INJECTION, EMULSION INTRAVENOUS at 11:40

## 2019-01-03 RX ADMIN — PROPOFOL 30 MG: 10 INJECTION, EMULSION INTRAVENOUS at 11:43

## 2019-01-03 RX ADMIN — LIDOCAINE HYDROCHLORIDE 50 MG: 10 INJECTION, SOLUTION INFILTRATION; PERINEURAL at 11:32

## 2019-01-03 RX ADMIN — PROPOFOL 30 MG: 10 INJECTION, EMULSION INTRAVENOUS at 11:35

## 2019-01-03 RX ADMIN — SODIUM CHLORIDE, POTASSIUM CHLORIDE, SODIUM LACTATE AND CALCIUM CHLORIDE 1000 ML: 600; 310; 30; 20 INJECTION, SOLUTION INTRAVENOUS at 11:23

## 2019-01-03 RX ADMIN — PROPOFOL 100 MG: 10 INJECTION, EMULSION INTRAVENOUS at 11:32

## 2019-01-03 NOTE — ANESTHESIA POSTPROCEDURE EVALUATION
Patient: Wally Vázquez    Procedure(s):  COMBINED COLONOSCOPY, REMOVE TUMOR/POLYP/LESION BY SNARE  Combined Colonoscopy, Submucosal Injection/Tattoo    Diagnosis:screening  Diagnosis Additional Information: No value filed.    Anesthesia Type:  No value filed.    Note:  Anesthesia Post Evaluation    Patient location during evaluation: Bedside  Patient participation: Able to fully participate in evaluation  Level of consciousness: sleepy but conscious  Pain management: adequate  Airway patency: patent  Cardiovascular status: stable  Respiratory status: nasal cannula  Hydration status: stable  PONV: none     Anesthetic complications: None          Last vitals:  Vitals:    01/03/19 1103 01/03/19 1156   BP: 155/86 108/52   Resp: 20 16   SpO2: 98% 97%         Electronically Signed By: FERNANDO Pinon CRNA  January 3, 2019  11:59 AM

## 2019-01-03 NOTE — ANESTHESIA CARE TRANSFER NOTE
Patient: Wally Vázquez    Procedure(s):  COMBINED COLONOSCOPY, REMOVE TUMOR/POLYP/LESION BY SNARE  Combined Colonoscopy, Submucosal Injection/Tattoo    Diagnosis: screening  Diagnosis Additional Information: No value filed.    Anesthesia Type:   No value filed.     Note:  Airway :Nasal Cannula  Patient transferred to:Phase II  Handoff Report: Identifed the Patient, Identified the Reponsible Provider, Reviewed the pertinent medical history, Discussed the surgical course, Reviewed Intra-OP anesthesia mangement and issues during anesthesia, Set expectations for post-procedure period and Allowed opportunity for questions and acknowledgement of understanding      Vitals: (Last set prior to Anesthesia Care Transfer)    CRNA VITALS  1/3/2019 1123 - 1/3/2019 1159      1/3/2019             Pulse:  70    SpO2:  97 %                Electronically Signed By: FERNANDO Pinon CRNA  January 3, 2019  11:59 AM

## 2019-01-03 NOTE — H&P
73 year old year old male here for colonoscopy for screening.    Patient Active Problem List   Diagnosis     Hypertension     Hyperlipidemia LDL goal <100       Past Medical History:   Diagnosis Date     BPH (benign prostatic hyperplasia)      Colon polyps     last colonsocopy 2008.     Diverticulosis      Mixed hyperlipidemia      Unspecified essential hypertension      Unspecified hyperplasia of prostate without urinary obstruction and other lower urinary tract symptoms (LUTS)        Past Surgical History:   Procedure Laterality Date     COLONOSCOPY  4/22/2013    Procedure: COLONOSCOPY;  Colonoscopy;  Surgeon: Miko Alcazar MD;  Location: [facility]     HERNIA REPAIR       VITRECTOMY PARSPLANA WITH 25 GAUGE SYSTEM  5/2/2013    Procedure: VITRECTOMY PARSPLANA WITH 25 GAUGE SYSTEM;  LEFT 25 GAUGE PARSPLANA, VITRECTOMY,  EPIRETINAL MEMBRANE REMOVAL, AIR FLUID EXCHANGE, INFUSION 20% SF6 GAS ;  Surgeon: Prashanth Allison MD;  Location: [facility]       @[facility] current outpatient medications on file.       Allergies   Allergen Reactions     Nkda [No Known Drug Allergies]        Pt reports that he has quit smoking. His smoking use included cigarettes. He has a 30.00 pack-year smoking history. He does not have any smokeless tobacco history on file. He reports that he drinks alcohol. He reports that he does not use drugs.    Exam:  Wt 87.5 kg (193 lb)   BMI 27.69 kg/m      Awake, Alert OX3  Lungs - CTA bilaterally  CV - RRR, no murmurs, distal pulses intact  Abd - soft, non-distended, non-tender, +BS  Extr - No cyanosis or edema    A/P 73 year old year old male in need of colonoscopy for screening. Risks, benefits, alternatives, and complications were discussed including the possibility of perforation and the patient agreed to proceed    Magen Hayward MD

## 2019-01-08 LAB — COPATH REPORT: NORMAL

## 2019-01-09 PROBLEM — K63.5 COLON POLYPS: Status: ACTIVE | Noted: 2019-01-09

## 2021-03-20 ENCOUNTER — HEALTH MAINTENANCE LETTER (OUTPATIENT)
Age: 76
End: 2021-03-20

## 2021-06-01 VITALS — WEIGHT: 200 LBS | HEIGHT: 69 IN | BODY MASS INDEX: 29.62 KG/M2

## 2021-06-18 NOTE — ANESTHESIA CARE TRANSFER NOTE
Last vitals:   Vitals:    05/22/18 1800   BP: 124/55   Pulse: 90   Resp: 12   Temp: 36.5  C (97.7  F)   SpO2: 97%     Patient's level of consciousness is drowsy  Spontaneous respirations: yes  Maintains airway independently: yes  Dentition unchanged: yes  Oropharynx: oral airway in place    QCDR Measures:  ASA# 20 - Surgical Safety Checklist: WHO surgical safety checklist completed prior to induction  PQRS# 430 - Adult PONV Prevention: 4558F - Pt received => 2 anti-emetic agents (different classes) preop & intraop  ASA# 8 - Peds PONV Prevention: NA - Not pediatric patient, not GA or 2 or more risk factors NOT present  PQRS# 424 - Olive-op Temp Management: 4559F - At least one body temp DOCUMENTED => 35.5C or 95.9F within required timeframe  PQRS# 426 - PACU Transfer Protocol:transfer of care checklist used.  ASA# 14 - Acute Post-op Pain: ASA14B - Patient did NOT experience pain >= 7 out of 10

## 2021-06-18 NOTE — ANESTHESIA POSTPROCEDURE EVALUATION
Patient: Wally Vázquez  CYSTOSCOPY, WITH TRANSURETHRAL RESECTION OF THE PROSTATE  USING QUANTA LASER  Anesthesia type: general    Patient location: PACU  Last vitals:   Vitals:    05/22/18 1830   BP: 144/63   Pulse: 83   Resp: 12   Temp:    SpO2: 95%     Post vital signs: stable  Level of consciousness: awake and responds to simple questions  Post-anesthesia pain: pain controlled  Post-anesthesia nausea and vomiting: no  Pulmonary: unassisted, return to baseline  Cardiovascular: stable and blood pressure at baseline  Hydration: adequate  Anesthetic events: no    QCDR Measures:  ASA# 11 - Olive-op Cardiac Arrest: ASA11B - Patient did NOT experience unanticipated cardiac arrest  ASA# 12 - Olive-op Mortality Rate: ASA12B - Patient did NOT die  ASA# 13 - PACU Re-Intubation Rate: ASA13B - Patient did NOT require a new airway mgmt  ASA# 10 - Composite Anes Safety: ASA10A - No serious adverse event    Additional Notes:

## 2021-06-18 NOTE — ANESTHESIA PREPROCEDURE EVALUATION
Anesthesia Evaluation      Patient summary reviewed   No history of anesthetic complications     Airway   Mallampati: I  Neck ROM: full   Pulmonary - negative ROS and normal exam                          Cardiovascular - normal exam  Exercise tolerance: > or = 4 METS  (+) hypertension, , hypercholesterolemia,      Neuro/Psych - negative ROS     Endo/Other - negative ROS      GI/Hepatic/Renal - negative ROS      Other findings: BPH. Hg 13.9, K 4.5, Cr 0.91.      Dental - normal exam                        Anesthesia Plan  Planned anesthetic: general LMA    ASA 2   Induction: intravenous   Anesthetic plan and risks discussed with: patient    Post-op plan: routine recovery

## 2021-09-04 ENCOUNTER — HEALTH MAINTENANCE LETTER (OUTPATIENT)
Age: 76
End: 2021-09-04

## 2022-04-16 ENCOUNTER — HEALTH MAINTENANCE LETTER (OUTPATIENT)
Age: 77
End: 2022-04-16

## 2022-10-16 ENCOUNTER — HEALTH MAINTENANCE LETTER (OUTPATIENT)
Age: 77
End: 2022-10-16

## 2023-06-17 ENCOUNTER — HEALTH MAINTENANCE LETTER (OUTPATIENT)
Age: 78
End: 2023-06-17

## (undated) RX ORDER — LIDOCAINE HYDROCHLORIDE 10 MG/ML
INJECTION, SOLUTION EPIDURAL; INFILTRATION; INTRACAUDAL; PERINEURAL
Status: DISPENSED
Start: 2019-01-03